# Patient Record
Sex: MALE | Race: OTHER | HISPANIC OR LATINO | URBAN - METROPOLITAN AREA
[De-identification: names, ages, dates, MRNs, and addresses within clinical notes are randomized per-mention and may not be internally consistent; named-entity substitution may affect disease eponyms.]

---

## 2018-10-30 ENCOUNTER — EMERGENCY (EMERGENCY)
Facility: HOSPITAL | Age: 23
LOS: 1 days | Discharge: HOME | End: 2018-10-30
Attending: STUDENT IN AN ORGANIZED HEALTH CARE EDUCATION/TRAINING PROGRAM
Payer: MEDICAID

## 2018-10-30 VITALS
RESPIRATION RATE: 18 BRPM | HEIGHT: 65 IN | TEMPERATURE: 99 F | WEIGHT: 149.91 LBS | SYSTOLIC BLOOD PRESSURE: 106 MMHG | OXYGEN SATURATION: 99 % | HEART RATE: 60 BPM | DIASTOLIC BLOOD PRESSURE: 68 MMHG

## 2018-10-30 LAB
C TRACH RRNA SPEC QL NAA+PROBE: SIGNIFICANT CHANGE UP
N GONORRHOEA RRNA SPEC QL NAA+PROBE: SIGNIFICANT CHANGE UP
SPECIMEN SOURCE: SIGNIFICANT CHANGE UP

## 2018-10-30 PROCEDURE — 99283 EMERGENCY DEPT VISIT LOW MDM: CPT

## 2018-10-30 PROCEDURE — 87491 CHLMYD TRACH DNA AMP PROBE: CPT

## 2018-10-30 PROCEDURE — 87591 N.GONORRHOEAE DNA AMP PROB: CPT

## 2018-10-30 NOTE — ED PROVIDER NOTE - MEDICAL DECISION MAKING DETAILS
23 y.o presenting with penile lesion. no redness or swelling to suggest infection. no ulcer to suggest syphillis. no vessicle to suggest herpes. no discharge or dysuria to suggest g/c. otherwise well appearing. no signs of systemic infection. will recommend warm compress, urology f.u given no acute emergency. will obtain g/c incase of infection,

## 2018-10-30 NOTE — ED PROVIDER NOTE - OBJECTIVE STATEMENT
23 y.o presenting with penile lesion x 2 week. endorses that he is a monogamous relation. denies penile discharge, fever, chills, ulcer, pain, testicular pain/swelling. endorses that he shaves his genital but lesion occurred prior his shaving.

## 2018-10-30 NOTE — ED PROVIDER NOTE - PHYSICAL EXAMINATION
penile exam: .5cm papule noted to left inferior penile shaft.   no vessicle, ulcer, discharge.   no ttp or swelling of testicle  no lymphanopathy. no ttp

## 2019-07-08 NOTE — ED ADULT NURSE NOTE - TEMPLATE LIST FOR HEAD TO TOE ASSESSMENT
Left first message on v/m to schedule NPT with [facility] for tissue around appendix per Sriga Foil. General